# Patient Record
Sex: MALE | Race: WHITE | NOT HISPANIC OR LATINO | URBAN - METROPOLITAN AREA
[De-identification: names, ages, dates, MRNs, and addresses within clinical notes are randomized per-mention and may not be internally consistent; named-entity substitution may affect disease eponyms.]

---

## 2018-03-29 ENCOUNTER — EMERGENCY (EMERGENCY)
Facility: HOSPITAL | Age: 38
LOS: 1 days | Discharge: HOME | End: 2018-03-29
Attending: EMERGENCY MEDICINE | Admitting: EMERGENCY MEDICINE
Payer: COMMERCIAL

## 2018-03-29 VITALS
SYSTOLIC BLOOD PRESSURE: 140 MMHG | OXYGEN SATURATION: 97 % | HEART RATE: 85 BPM | DIASTOLIC BLOOD PRESSURE: 81 MMHG | RESPIRATION RATE: 18 BRPM | TEMPERATURE: 98 F

## 2018-03-29 VITALS
OXYGEN SATURATION: 98 % | WEIGHT: 199.96 LBS | RESPIRATION RATE: 18 BRPM | HEIGHT: 75 IN | SYSTOLIC BLOOD PRESSURE: 132 MMHG | TEMPERATURE: 98 F | DIASTOLIC BLOOD PRESSURE: 86 MMHG | HEART RATE: 91 BPM

## 2018-03-29 DIAGNOSIS — R07.89 OTHER CHEST PAIN: ICD-10-CM

## 2018-03-29 DIAGNOSIS — F17.200 NICOTINE DEPENDENCE, UNSPECIFIED, UNCOMPLICATED: ICD-10-CM

## 2018-03-29 LAB
ALBUMIN SERPL ELPH-MCNC: 4.7 G/DL — SIGNIFICANT CHANGE UP (ref 3.3–5)
ALP SERPL-CCNC: 57 U/L — SIGNIFICANT CHANGE UP (ref 40–120)
ALT FLD-CCNC: 33 U/L — SIGNIFICANT CHANGE UP (ref 10–45)
ANION GAP SERPL CALC-SCNC: 11 MMOL/L — SIGNIFICANT CHANGE UP (ref 5–17)
APTT BLD: 29.3 SEC — SIGNIFICANT CHANGE UP (ref 27.5–37.4)
AST SERPL-CCNC: 26 U/L — SIGNIFICANT CHANGE UP (ref 10–40)
BASOPHILS NFR BLD AUTO: 0.5 % — SIGNIFICANT CHANGE UP (ref 0–2)
BILIRUB SERPL-MCNC: 0.4 MG/DL — SIGNIFICANT CHANGE UP (ref 0.2–1.2)
BUN SERPL-MCNC: 15 MG/DL — SIGNIFICANT CHANGE UP (ref 7–23)
CALCIUM SERPL-MCNC: 9.3 MG/DL — SIGNIFICANT CHANGE UP (ref 8.4–10.5)
CHLORIDE SERPL-SCNC: 103 MMOL/L — SIGNIFICANT CHANGE UP (ref 96–108)
CO2 SERPL-SCNC: 29 MMOL/L — SIGNIFICANT CHANGE UP (ref 22–31)
CREAT SERPL-MCNC: 0.95 MG/DL — SIGNIFICANT CHANGE UP (ref 0.5–1.3)
EOSINOPHIL NFR BLD AUTO: 0.7 % — SIGNIFICANT CHANGE UP (ref 0–6)
GLUCOSE SERPL-MCNC: 105 MG/DL — HIGH (ref 70–99)
HCT VFR BLD CALC: 44.2 % — SIGNIFICANT CHANGE UP (ref 39–50)
HGB BLD-MCNC: 14.6 G/DL — SIGNIFICANT CHANGE UP (ref 13–17)
HIV 1+2 AB+HIV1 P24 AG SERPL QL IA: SIGNIFICANT CHANGE UP
INR BLD: 1.03 — SIGNIFICANT CHANGE UP (ref 0.88–1.16)
LYMPHOCYTES # BLD AUTO: 26.6 % — SIGNIFICANT CHANGE UP (ref 13–44)
MAGNESIUM SERPL-MCNC: 1.9 MG/DL — SIGNIFICANT CHANGE UP (ref 1.6–2.6)
MCHC RBC-ENTMCNC: 29 PG — SIGNIFICANT CHANGE UP (ref 27–34)
MCHC RBC-ENTMCNC: 33 G/DL — SIGNIFICANT CHANGE UP (ref 32–36)
MCV RBC AUTO: 87.7 FL — SIGNIFICANT CHANGE UP (ref 80–100)
MONOCYTES NFR BLD AUTO: 9.8 % — SIGNIFICANT CHANGE UP (ref 2–14)
NEUTROPHILS NFR BLD AUTO: 62.4 % — SIGNIFICANT CHANGE UP (ref 43–77)
PLATELET # BLD AUTO: 210 K/UL — SIGNIFICANT CHANGE UP (ref 150–400)
POTASSIUM SERPL-MCNC: 3.8 MMOL/L — SIGNIFICANT CHANGE UP (ref 3.5–5.3)
POTASSIUM SERPL-SCNC: 3.8 MMOL/L — SIGNIFICANT CHANGE UP (ref 3.5–5.3)
PROT SERPL-MCNC: 7.3 G/DL — SIGNIFICANT CHANGE UP (ref 6–8.3)
PROTHROM AB SERPL-ACNC: 11.4 SEC — SIGNIFICANT CHANGE UP (ref 9.8–12.7)
RBC # BLD: 5.04 M/UL — SIGNIFICANT CHANGE UP (ref 4.2–5.8)
RBC # FLD: 12.6 % — SIGNIFICANT CHANGE UP (ref 10.3–16.9)
SODIUM SERPL-SCNC: 143 MMOL/L — SIGNIFICANT CHANGE UP (ref 135–145)
WBC # BLD: 5.9 K/UL — SIGNIFICANT CHANGE UP (ref 3.8–10.5)
WBC # FLD AUTO: 5.9 K/UL — SIGNIFICANT CHANGE UP (ref 3.8–10.5)

## 2018-03-29 PROCEDURE — 36415 COLL VENOUS BLD VENIPUNCTURE: CPT

## 2018-03-29 PROCEDURE — 82553 CREATINE MB FRACTION: CPT

## 2018-03-29 PROCEDURE — 93005 ELECTROCARDIOGRAM TRACING: CPT

## 2018-03-29 PROCEDURE — 85610 PROTHROMBIN TIME: CPT

## 2018-03-29 PROCEDURE — 71046 X-RAY EXAM CHEST 2 VIEWS: CPT | Mod: 26

## 2018-03-29 PROCEDURE — 85652 RBC SED RATE AUTOMATED: CPT

## 2018-03-29 PROCEDURE — 85730 THROMBOPLASTIN TIME PARTIAL: CPT

## 2018-03-29 PROCEDURE — 99283 EMERGENCY DEPT VISIT LOW MDM: CPT | Mod: 25

## 2018-03-29 PROCEDURE — 93010 ELECTROCARDIOGRAM REPORT: CPT

## 2018-03-29 PROCEDURE — 87389 HIV-1 AG W/HIV-1&-2 AB AG IA: CPT

## 2018-03-29 PROCEDURE — 80053 COMPREHEN METABOLIC PANEL: CPT

## 2018-03-29 PROCEDURE — 84484 ASSAY OF TROPONIN QUANT: CPT

## 2018-03-29 PROCEDURE — 85025 COMPLETE CBC W/AUTO DIFF WBC: CPT

## 2018-03-29 PROCEDURE — 86140 C-REACTIVE PROTEIN: CPT

## 2018-03-29 PROCEDURE — 99285 EMERGENCY DEPT VISIT HI MDM: CPT | Mod: 25

## 2018-03-29 PROCEDURE — 83735 ASSAY OF MAGNESIUM: CPT

## 2018-03-29 PROCEDURE — 82550 ASSAY OF CK (CPK): CPT

## 2018-03-29 PROCEDURE — 71046 X-RAY EXAM CHEST 2 VIEWS: CPT

## 2018-03-29 NOTE — ED ADULT NURSE NOTE - OBJECTIVE STATEMENT
Pt presents to ED c/o intermittent CP x3 weeks, pt is unable to describe states "it just feels like a strange sensation in my chest." Pt reports pain both at rest and on exertion, currently to L chest wall 3/10. Pt denies associated fevers, chills, palpitations, SOB, cough. Pt saw PCP and was given muscle relaxer and anti inflammatory for pain which is not helping. Pt with recent travel to [place], though pt reports pain came on before travel. Pt presents in NAD speaking full sentences ambulatory through triage. EKG preformed in triage.

## 2018-03-29 NOTE — ED PROVIDER NOTE - OBJECTIVE STATEMENT
Patient is a 38 year old male with past medical history of Gastroesophageal Reflux and H. pylori (Treated) presenting with Chest Pain.     Patient reports Approximately 3 Weeks Ago, was at Gym, Weight Lifting, and After Exercising Noticed Pain in Center of Chest, Sternal Pain. Patient reports "Tightness" Sensation. Patient reports Associated with Right and Left Pectoral Pain, Left>Right. Patient reports 3-4/10 in Severity. Patient reports Non-Radiating, but Will Sometimes Get "Pressure Feeling in Left Arm". Patient reports went to Urgent Care, had EKG Performed, and was Diagnosed with Costochondritis. Patient reports was Prescribed Methocarbamol, Meloxicam, which He Took For 2 Weeks without Improvement. Patient with Difficult Time Described Left Sided Chest Discomfort. Patient reports Intermittent. Patient reports Symptoms Worsened by Weight Lifting. However, reports Will Develop Symptoms When Sitting Down Nothing. Patient reports Stress Brings on Symptoms As Well. Patient reports Symptoms Not Reproducible to Palpation. Patient reports Symptoms Last Sometimes Few Second, Other Times Few Minutes, and Can Sometimes Occur 4-5 Times/Hour. Patient denies Associated Dizziness/Lightheadedness, Palpitations, Diaphoresis, Nausea, Vomiting, SOB. Pain is Not Worsened by Deep Breaths. Patient denies Associated Fever, Chills, Cough, Rhinorrhea, Sinus Congestion, Sore Throat.     Patient denies Night Sweats, Weight Loss, Abdominal Pain, Diarrhea, Constipation, Melena, Hematochezia, Dysuria, Hematuria, Penile Discharge, Rashes, Arthralgias, Pain/Swelling of Lower Extremities.     Patient reports Recent Travel to [place], However, Symptoms Present Before Trip.

## 2018-03-29 NOTE — ED PROVIDER NOTE - CHPI ED SYMPTOMS NEG
no back pain/no diaphoresis/no syncope/no cough/no chills/no nausea/no dizziness/no shortness of breath/no fever/no vomiting

## 2018-03-29 NOTE — ED PROVIDER NOTE - MEDICAL DECISION MAKING DETAILS
Etiology of Chest Pain Unclear. Based on History Suspect Musculoskeletal or Anxiety Induced. EKG NSR, No Ischemic Changes. Will Check CBC, CMP, Mg, PT/INR, PTT, ESR, CRP, HIV 1/2 Ag/Ab Screening by [facility], and Chest XR. Patient Currently Asymptomatic.

## 2018-03-29 NOTE — ED ADULT TRIAGE NOTE - CHIEF COMPLAINT QUOTE
pt c/o intermittent left sided chest pain x 2-3 weeks. pt was seen at urgent care 3 weeks ago and has been taking muscle relaxant with no improvement. was in [place] last week. pt is nonsmoker.

## 2018-03-29 NOTE — ED PROVIDER NOTE - PHYSICAL EXAMINATION
General: Well Appearing, Anxious  HEENT: NCAT, PERRLA B/L, EOMI B/L, No Oropharyngeal Erythema or Exudates   Neck: Supple, No Cervical Spinal Tenderness, No Cervical or Supraclavicular Lymphadenopathy  Heart: Normal S1+S2, RRR, No M/R/G  Lungs: CTA B/L, No W/R/R  Chest: No Reproducible Chest Pain to Palpation, No Rashes   Abdomen: Soft, NT/ND, Normoactive Bowel Sounds  Back: No Spinal or CVA Tenderness   Extremities: Warm, Well Perfused, 2+ Radial and DP Pulses Bilaterally, No Edema  Skin: Warm, Dry  Neurological: AAOx3, No Focal Motor or Sensory Deficits, Normal Gait

## 2018-03-29 NOTE — ED PROVIDER NOTE - ATTENDING CONTRIBUTION TO CARE
Patient is a 38 year old male with past medical history of Gastroesophageal Reflux and H. pylori (Treated) presenting with Chest Pain.     Patient reports Approximately 3 Weeks Ago, was at Gym, Weight Lifting, and After Exercising Noticed Pain in Center of Chest, Sternal Pain. Patient reports "Tightness" Sensation. Patient reports Associated with Right and Left Pectoral Pain, Left>Right. Patient reports 3-4/10 in Severity. Patient reports Non-Radiating, but Will Sometimes Get "Pressure Feeling in Left Arm". Patient reports went to Urgent Care, had EKG Performed, and was Diagnosed with Costochondritis. Patient reports was Prescribed Methocarbamol, Meloxicam, which He Took For 2 Weeks without Improvement. Patient with Difficult Time Described Left Sided Chest Discomfort. Patient reports Intermittent. Patient reports Symptoms Worsened by Weight Lifting. However, reports Will Develop Symptoms When Sitting Down Nothing. Patient reports Stress Brings on Symptoms As Well. Patient reports Symptoms Not Reproducible to Palpation. Patient reports Symptoms Last Sometimes Few Second, Other Times Few Minutes, and Can Sometimes Occur 4-5 Times/Hour. Patient denies Associated Dizziness/Lightheadedness, Palpitations, Diaphoresis, Nausea, Vomiting, SOB. Pain is Not Worsened by Deep Breaths. Patient denies Associated Fever, Chills, Cough, Rhinorrhea, Sinus Congestion, Sore Throat.     Patient denies Night Sweats, Weight Loss, Abdominal Pain, Diarrhea, Constipation, Melena, Hematochezia, Dysuria, Hematuria, Penile Discharge, Rashes, Arthralgias, Pain/Swelling of Lower Extremities.     Patient reports Recent Travel to [place], However, Symptoms Present Before Trip.      PE anxious appearing with mild reproducible pain to anterior chest, lungs cta b/l, workup in ED including ekg and trop negative, cxr neg - discussed with pt costochondritis as cause of pain, recommend antiinflammatories and follow up with PMD for further mgmt and further outpt workup if symptoms are not resolved.

## 2019-04-25 NOTE — ED ADULT NURSE NOTE - CHIEF COMPLAINT QUOTE
pt c/o intermittent left sided chest pain x 2-3 weeks. pt was seen at urgent care 3 weeks ago and has been taking muscle relaxant with no improvement. was in [place] last week. pt is nonsmoker.
impairments found

## 2022-05-12 NOTE — ED PROVIDER NOTE - PROGRESS NOTE DETAILS
pt received semi-supine in bed +heplock, +tele, c/o headache
Labs Notable for Mildly Elevated CK. CXR without Acute Findings. Etiology of Chest Discomfort Unclear. Will Need Further Work-Up Outpatient. Discussed at Length with Patient, who Has Arranged Follow-Up with PCP in 2 Weeks.
